# Patient Record
Sex: FEMALE | Race: WHITE | Employment: FULL TIME | URBAN - METROPOLITAN AREA
[De-identification: names, ages, dates, MRNs, and addresses within clinical notes are randomized per-mention and may not be internally consistent; named-entity substitution may affect disease eponyms.]

---

## 2018-01-24 ENCOUNTER — HOSPITAL ENCOUNTER (OUTPATIENT)
Dept: MAMMOGRAPHY | Age: 58
Discharge: HOME | End: 2018-01-24
Attending: INTERNAL MEDICINE | Admitting: INTERNAL MEDICINE

## 2018-01-24 DIAGNOSIS — Z12.31 VISIT FOR SCREENING MAMMOGRAM: ICD-10-CM

## 2019-03-29 ENCOUNTER — HOSPITAL ENCOUNTER (OUTPATIENT)
Dept: MAMMOGRAPHY | Age: 59
Discharge: HOME | End: 2019-04-03
Payer: COMMERCIAL

## 2019-03-29 DIAGNOSIS — Z12.31 VISIT FOR SCREENING MAMMOGRAM: ICD-10-CM

## 2019-03-29 PROCEDURE — 77067 SCR MAMMO BI INCL CAD: CPT

## 2019-12-24 ENCOUNTER — HOSPITAL ENCOUNTER (OUTPATIENT)
Age: 59
Setting detail: OBSERVATION
Discharge: HOME | End: 2019-12-25
Attending: EMERGENCY MEDICINE | Admitting: INTERNAL MEDICINE
Payer: COMMERCIAL

## 2019-12-24 ENCOUNTER — APPOINTMENT (OUTPATIENT)
Dept: GENERAL RADIOLOGY | Age: 59
End: 2019-12-24
Payer: COMMERCIAL

## 2019-12-24 DIAGNOSIS — R07.9 CHEST PAIN, UNSPECIFIED TYPE: Primary | ICD-10-CM

## 2019-12-24 LAB
A/G RATIO: 2 (ref 1.1–2.2)
ALBUMIN SERPL-MCNC: 4.6 G/DL (ref 3.4–5)
ALP BLD-CCNC: 50 U/L (ref 40–129)
ALT SERPL-CCNC: 14 U/L (ref 10–40)
ANION GAP SERPL CALCULATED.3IONS-SCNC: 15 MMOL/L (ref 3–16)
AST SERPL-CCNC: 19 U/L (ref 15–37)
BASOPHILS ABSOLUTE: 0 K/UL (ref 0–0.2)
BASOPHILS RELATIVE PERCENT: 0.6 %
BILIRUB SERPL-MCNC: 0.3 MG/DL (ref 0–1)
BUN BLDV-MCNC: 16 MG/DL (ref 7–20)
CALCIUM SERPL-MCNC: 10 MG/DL (ref 8.3–10.6)
CHLORIDE BLD-SCNC: 107 MMOL/L (ref 99–110)
CO2: 21 MMOL/L (ref 21–32)
CREAT SERPL-MCNC: 0.6 MG/DL (ref 0.6–1.2)
EOSINOPHILS ABSOLUTE: 0.2 K/UL (ref 0–0.6)
EOSINOPHILS RELATIVE PERCENT: 2.5 %
GFR AFRICAN AMERICAN: >60
GFR NON-AFRICAN AMERICAN: >60
GLOBULIN: 2.3 G/DL
GLUCOSE BLD-MCNC: 102 MG/DL (ref 70–99)
HCT VFR BLD CALC: 38.8 % (ref 36–48)
HEMOGLOBIN: 13 G/DL (ref 12–16)
LYMPHOCYTES ABSOLUTE: 1.7 K/UL (ref 1–5.1)
LYMPHOCYTES RELATIVE PERCENT: 26.5 %
MCH RBC QN AUTO: 29.9 PG (ref 26–34)
MCHC RBC AUTO-ENTMCNC: 33.5 G/DL (ref 31–36)
MCV RBC AUTO: 89.2 FL (ref 80–100)
MONOCYTES ABSOLUTE: 0.5 K/UL (ref 0–1.3)
MONOCYTES RELATIVE PERCENT: 6.8 %
NEUTROPHILS ABSOLUTE: 4.2 K/UL (ref 1.7–7.7)
NEUTROPHILS RELATIVE PERCENT: 63.6 %
PDW BLD-RTO: 13.7 % (ref 12.4–15.4)
PLATELET # BLD: 225 K/UL (ref 135–450)
PMV BLD AUTO: 9.2 FL (ref 5–10.5)
POTASSIUM REFLEX MAGNESIUM: 4.2 MMOL/L (ref 3.5–5.1)
RBC # BLD: 4.35 M/UL (ref 4–5.2)
SODIUM BLD-SCNC: 143 MMOL/L (ref 136–145)
TOTAL PROTEIN: 6.9 G/DL (ref 6.4–8.2)
TROPONIN: <0.01 NG/ML
WBC # BLD: 6.6 K/UL (ref 4–11)

## 2019-12-24 PROCEDURE — 71046 X-RAY EXAM CHEST 2 VIEWS: CPT

## 2019-12-24 PROCEDURE — 2580000003 HC RX 258: Performed by: INTERNAL MEDICINE

## 2019-12-24 PROCEDURE — 99285 EMERGENCY DEPT VISIT HI MDM: CPT

## 2019-12-24 PROCEDURE — 6370000000 HC RX 637 (ALT 250 FOR IP): Performed by: INTERNAL MEDICINE

## 2019-12-24 PROCEDURE — 85025 COMPLETE CBC W/AUTO DIFF WBC: CPT

## 2019-12-24 PROCEDURE — G0378 HOSPITAL OBSERVATION PER HR: HCPCS

## 2019-12-24 PROCEDURE — 36415 COLL VENOUS BLD VENIPUNCTURE: CPT

## 2019-12-24 PROCEDURE — 80053 COMPREHEN METABOLIC PANEL: CPT

## 2019-12-24 PROCEDURE — 93005 ELECTROCARDIOGRAM TRACING: CPT | Performed by: PHYSICIAN ASSISTANT

## 2019-12-24 PROCEDURE — 84484 ASSAY OF TROPONIN QUANT: CPT

## 2019-12-24 PROCEDURE — 6370000000 HC RX 637 (ALT 250 FOR IP): Performed by: PHYSICIAN ASSISTANT

## 2019-12-24 PROCEDURE — 82607 VITAMIN B-12: CPT

## 2019-12-24 PROCEDURE — 82746 ASSAY OF FOLIC ACID SERUM: CPT

## 2019-12-24 RX ORDER — ASPIRIN 81 MG/1
324 TABLET, CHEWABLE ORAL ONCE
Status: COMPLETED | OUTPATIENT
Start: 2019-12-24 | End: 2019-12-24

## 2019-12-24 RX ORDER — SODIUM CHLORIDE 0.9 % (FLUSH) 0.9 %
10 SYRINGE (ML) INJECTION EVERY 12 HOURS SCHEDULED
Status: DISCONTINUED | OUTPATIENT
Start: 2019-12-24 | End: 2019-12-25 | Stop reason: HOSPADM

## 2019-12-24 RX ORDER — SODIUM CHLORIDE 0.9 % (FLUSH) 0.9 %
10 SYRINGE (ML) INJECTION PRN
Status: DISCONTINUED | OUTPATIENT
Start: 2019-12-24 | End: 2019-12-25 | Stop reason: HOSPADM

## 2019-12-24 RX ORDER — ACETAMINOPHEN 325 MG/1
650 TABLET ORAL EVERY 4 HOURS PRN
Status: DISCONTINUED | OUTPATIENT
Start: 2019-12-24 | End: 2019-12-25 | Stop reason: HOSPADM

## 2019-12-24 RX ORDER — NITROGLYCERIN 0.4 MG/1
0.4 TABLET SUBLINGUAL EVERY 5 MIN PRN
Status: DISCONTINUED | OUTPATIENT
Start: 2019-12-24 | End: 2019-12-25 | Stop reason: HOSPADM

## 2019-12-24 RX ORDER — POTASSIUM CHLORIDE 20 MEQ/1
40 TABLET, EXTENDED RELEASE ORAL PRN
Status: DISCONTINUED | OUTPATIENT
Start: 2019-12-24 | End: 2019-12-25 | Stop reason: HOSPADM

## 2019-12-24 RX ORDER — ASPIRIN 81 MG/1
81 TABLET, CHEWABLE ORAL DAILY
Status: DISCONTINUED | OUTPATIENT
Start: 2019-12-25 | End: 2019-12-25 | Stop reason: HOSPADM

## 2019-12-24 RX ORDER — ONDANSETRON 2 MG/ML
4 INJECTION INTRAMUSCULAR; INTRAVENOUS EVERY 6 HOURS PRN
Status: DISCONTINUED | OUTPATIENT
Start: 2019-12-24 | End: 2019-12-25 | Stop reason: HOSPADM

## 2019-12-24 RX ORDER — MAGNESIUM SULFATE 1 G/100ML
1 INJECTION INTRAVENOUS PRN
Status: DISCONTINUED | OUTPATIENT
Start: 2019-12-24 | End: 2019-12-25 | Stop reason: HOSPADM

## 2019-12-24 RX ORDER — ATORVASTATIN CALCIUM 40 MG/1
40 TABLET, FILM COATED ORAL NIGHTLY
Status: DISCONTINUED | OUTPATIENT
Start: 2019-12-24 | End: 2019-12-25 | Stop reason: HOSPADM

## 2019-12-24 RX ORDER — NITROGLYCERIN 0.4 MG/1
0.4 TABLET SUBLINGUAL EVERY 5 MIN PRN
Status: DISCONTINUED | OUTPATIENT
Start: 2019-12-24 | End: 2019-12-24 | Stop reason: SDUPTHER

## 2019-12-24 RX ORDER — POTASSIUM CHLORIDE 7.45 MG/ML
10 INJECTION INTRAVENOUS PRN
Status: DISCONTINUED | OUTPATIENT
Start: 2019-12-24 | End: 2019-12-25 | Stop reason: HOSPADM

## 2019-12-24 RX ADMIN — ASPIRIN 81 MG 324 MG: 81 TABLET ORAL at 18:33

## 2019-12-24 RX ADMIN — METOPROLOL TARTRATE 25 MG: 25 TABLET ORAL at 20:45

## 2019-12-24 RX ADMIN — SODIUM CHLORIDE, PRESERVATIVE FREE 10 ML: 5 INJECTION INTRAVENOUS at 22:20

## 2019-12-24 RX ADMIN — ATORVASTATIN CALCIUM 40 MG: 40 TABLET, FILM COATED ORAL at 22:20

## 2019-12-24 ASSESSMENT — PAIN SCALES - GENERAL
PAINLEVEL_OUTOF10: 8
PAINLEVEL_OUTOF10: 0

## 2019-12-24 ASSESSMENT — PAIN DESCRIPTION - DESCRIPTORS: DESCRIPTORS: BURNING

## 2019-12-24 ASSESSMENT — PAIN DESCRIPTION - PAIN TYPE: TYPE: ACUTE PAIN

## 2019-12-24 ASSESSMENT — HEART SCORE: ECG: 1

## 2019-12-24 ASSESSMENT — ENCOUNTER SYMPTOMS
COLOR CHANGE: 0
VOMITING: 1
CHEST TIGHTNESS: 1
ABDOMINAL PAIN: 0
NAUSEA: 1
SHORTNESS OF BREATH: 0

## 2019-12-24 ASSESSMENT — PAIN - FUNCTIONAL ASSESSMENT: PAIN_FUNCTIONAL_ASSESSMENT: ACTIVITIES ARE NOT PREVENTED

## 2019-12-24 ASSESSMENT — PAIN DESCRIPTION - ONSET: ONSET: GRADUAL

## 2019-12-24 ASSESSMENT — PAIN DESCRIPTION - FREQUENCY: FREQUENCY: INTERMITTENT

## 2019-12-24 ASSESSMENT — PAIN DESCRIPTION - PROGRESSION: CLINICAL_PROGRESSION: NOT CHANGED

## 2019-12-24 ASSESSMENT — PAIN DESCRIPTION - LOCATION: LOCATION: CHEST

## 2019-12-25 VITALS
BODY MASS INDEX: 29.09 KG/M2 | HEART RATE: 60 BPM | DIASTOLIC BLOOD PRESSURE: 96 MMHG | RESPIRATION RATE: 17 BRPM | SYSTOLIC BLOOD PRESSURE: 161 MMHG | HEIGHT: 65 IN | OXYGEN SATURATION: 96 % | TEMPERATURE: 98.4 F | WEIGHT: 174.6 LBS

## 2019-12-25 LAB
EKG ATRIAL RATE: 62 BPM
EKG ATRIAL RATE: 72 BPM
EKG ATRIAL RATE: 89 BPM
EKG DIAGNOSIS: NORMAL
EKG P AXIS: 64 DEGREES
EKG P AXIS: 66 DEGREES
EKG P AXIS: 69 DEGREES
EKG P-R INTERVAL: 156 MS
EKG P-R INTERVAL: 158 MS
EKG P-R INTERVAL: 162 MS
EKG Q-T INTERVAL: 388 MS
EKG Q-T INTERVAL: 408 MS
EKG Q-T INTERVAL: 430 MS
EKG QRS DURATION: 92 MS
EKG QRS DURATION: 98 MS
EKG QRS DURATION: 98 MS
EKG QTC CALCULATION (BAZETT): 436 MS
EKG QTC CALCULATION (BAZETT): 446 MS
EKG QTC CALCULATION (BAZETT): 472 MS
EKG R AXIS: 53 DEGREES
EKG R AXIS: 68 DEGREES
EKG R AXIS: 80 DEGREES
EKG T AXIS: 54 DEGREES
EKG T AXIS: 54 DEGREES
EKG T AXIS: 66 DEGREES
EKG VENTRICULAR RATE: 62 BPM
EKG VENTRICULAR RATE: 72 BPM
EKG VENTRICULAR RATE: 89 BPM
FERRITIN: 56.8 NG/ML (ref 15–150)
FOLATE: >20 NG/ML (ref 4.78–24.2)
HCT VFR BLD CALC: 38.7 % (ref 36–48)
HEMOGLOBIN: 12.7 G/DL (ref 12–16)
IRON SATURATION: 18 % (ref 15–50)
IRON: 51 UG/DL (ref 37–145)
MCH RBC QN AUTO: 29.2 PG (ref 26–34)
MCHC RBC AUTO-ENTMCNC: 32.8 G/DL (ref 31–36)
MCV RBC AUTO: 89.1 FL (ref 80–100)
PDW BLD-RTO: 14.3 % (ref 12.4–15.4)
PLATELET # BLD: 234 K/UL (ref 135–450)
PMV BLD AUTO: 9.3 FL (ref 5–10.5)
RBC # BLD: 4.34 M/UL (ref 4–5.2)
TOTAL IRON BINDING CAPACITY: 285 UG/DL (ref 260–445)
TROPONIN: <0.01 NG/ML
TROPONIN: <0.01 NG/ML
VITAMIN B-12: 511 PG/ML (ref 211–911)
WBC # BLD: 5.4 K/UL (ref 4–11)

## 2019-12-25 PROCEDURE — 6370000000 HC RX 637 (ALT 250 FOR IP): Performed by: NURSE PRACTITIONER

## 2019-12-25 PROCEDURE — 93010 ELECTROCARDIOGRAM REPORT: CPT | Performed by: INTERNAL MEDICINE

## 2019-12-25 PROCEDURE — 94760 N-INVAS EAR/PLS OXIMETRY 1: CPT

## 2019-12-25 PROCEDURE — 93005 ELECTROCARDIOGRAM TRACING: CPT | Performed by: INTERNAL MEDICINE

## 2019-12-25 PROCEDURE — 85027 COMPLETE CBC AUTOMATED: CPT

## 2019-12-25 PROCEDURE — 83540 ASSAY OF IRON: CPT

## 2019-12-25 PROCEDURE — 6370000000 HC RX 637 (ALT 250 FOR IP): Performed by: INTERNAL MEDICINE

## 2019-12-25 PROCEDURE — 99205 OFFICE O/P NEW HI 60 MIN: CPT | Performed by: INTERNAL MEDICINE

## 2019-12-25 PROCEDURE — 6360000002 HC RX W HCPCS: Performed by: INTERNAL MEDICINE

## 2019-12-25 PROCEDURE — 82728 ASSAY OF FERRITIN: CPT

## 2019-12-25 PROCEDURE — 96372 THER/PROPH/DIAG INJ SC/IM: CPT

## 2019-12-25 PROCEDURE — G0378 HOSPITAL OBSERVATION PER HR: HCPCS

## 2019-12-25 PROCEDURE — 83550 IRON BINDING TEST: CPT

## 2019-12-25 PROCEDURE — 2580000003 HC RX 258: Performed by: INTERNAL MEDICINE

## 2019-12-25 PROCEDURE — 84484 ASSAY OF TROPONIN QUANT: CPT

## 2019-12-25 PROCEDURE — 36415 COLL VENOUS BLD VENIPUNCTURE: CPT

## 2019-12-25 RX ORDER — METFORMIN HYDROCHLORIDE 500 MG/1
500 TABLET, EXTENDED RELEASE ORAL
COMMUNITY
End: 2021-05-30

## 2019-12-25 RX ORDER — ASCORBIC ACID 1000 MG
TABLET ORAL
COMMUNITY

## 2019-12-25 RX ORDER — ASCORBIC ACID 500 MG
500 TABLET ORAL DAILY
COMMUNITY
End: 2021-05-30

## 2019-12-25 RX ORDER — ROSUVASTATIN CALCIUM 10 MG/1
10 TABLET, COATED ORAL DAILY
COMMUNITY

## 2019-12-25 RX ORDER — M-VIT,TX,IRON,MINS/CALC/FOLIC 27MG-0.4MG
1 TABLET ORAL DAILY
COMMUNITY

## 2019-12-25 RX ORDER — PANTOPRAZOLE SODIUM 40 MG/1
40 TABLET, DELAYED RELEASE ORAL ONCE
Status: COMPLETED | OUTPATIENT
Start: 2019-12-25 | End: 2019-12-25

## 2019-12-25 RX ORDER — PANTOPRAZOLE SODIUM 40 MG/1
40 TABLET, DELAYED RELEASE ORAL
Qty: 180 TABLET | Refills: 0 | Status: SHIPPED | OUTPATIENT
Start: 2019-12-25 | End: 2021-05-30

## 2019-12-25 RX ADMIN — PANTOPRAZOLE SODIUM 40 MG: 40 TABLET, DELAYED RELEASE ORAL at 11:03

## 2019-12-25 RX ADMIN — ASPIRIN 81 MG 81 MG: 81 TABLET ORAL at 10:13

## 2019-12-25 RX ADMIN — SODIUM CHLORIDE, PRESERVATIVE FREE 10 ML: 5 INJECTION INTRAVENOUS at 10:13

## 2019-12-25 RX ADMIN — ENOXAPARIN SODIUM 40 MG: 40 INJECTION SUBCUTANEOUS at 10:13

## 2019-12-25 ASSESSMENT — PAIN SCALES - GENERAL
PAINLEVEL_OUTOF10: 0
PAINLEVEL_OUTOF10: 0

## 2020-01-31 ENCOUNTER — HOSPITAL ENCOUNTER (OUTPATIENT)
Dept: NUCLEAR MEDICINE | Age: 60
Discharge: HOME | End: 2020-01-31
Payer: COMMERCIAL

## 2020-01-31 PROCEDURE — 78264 GASTRIC EMPTYING IMG STUDY: CPT

## 2020-01-31 PROCEDURE — A9541 TC99M SULFUR COLLOID: HCPCS | Performed by: INTERNAL MEDICINE

## 2020-01-31 PROCEDURE — 3430000000 HC RX DIAGNOSTIC RADIOPHARMACEUTICAL: Performed by: INTERNAL MEDICINE

## 2020-01-31 RX ADMIN — Medication 500 MICRO CURIE: at 09:34

## 2020-11-05 ENCOUNTER — HOSPITAL ENCOUNTER (OUTPATIENT)
Dept: MAMMOGRAPHY | Age: 60
Discharge: HOME | End: 2020-11-10
Payer: COMMERCIAL

## 2020-11-05 PROCEDURE — 77067 SCR MAMMO BI INCL CAD: CPT

## 2021-05-30 ENCOUNTER — APPOINTMENT (OUTPATIENT)
Dept: GENERAL RADIOLOGY | Age: 61
End: 2021-05-30
Payer: COMMERCIAL

## 2021-05-30 ENCOUNTER — HOSPITAL ENCOUNTER (EMERGENCY)
Age: 61
Discharge: HOME | End: 2021-05-31
Payer: COMMERCIAL

## 2021-05-30 DIAGNOSIS — S82.001A CLOSED DISPLACED FRACTURE OF RIGHT PATELLA, UNSPECIFIED FRACTURE MORPHOLOGY, INITIAL ENCOUNTER: ICD-10-CM
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
## 2021-05-30 PROCEDURE — 73562 X-RAY EXAM OF KNEE 3: CPT

## 2021-05-30 PROCEDURE — 99284 EMERGENCY DEPT VISIT MOD MDM: CPT

## 2021-05-30 RX ORDER — ASPIRIN 81 MG/1
81 TABLET, CHEWABLE ORAL DAILY
COMMUNITY

## 2021-05-30 RX ORDER — OMEPRAZOLE 10 MG/1
10 CAPSULE, DELAYED RELEASE ORAL DAILY
COMMUNITY

## 2021-05-31 VITALS
BODY MASS INDEX: 28.81 KG/M2 | SYSTOLIC BLOOD PRESSURE: 111 MMHG | OXYGEN SATURATION: 95 % | HEIGHT: 66 IN | WEIGHT: 179.23 LBS | RESPIRATION RATE: 18 BRPM | TEMPERATURE: 97.9 F | DIASTOLIC BLOOD PRESSURE: 75 MMHG | HEART RATE: 69 BPM

## 2021-05-31 RX ORDER — HYDROCODONE BITARTRATE AND ACETAMINOPHEN 5; 325 MG/1; MG/1
1 TABLET ORAL EVERY 6 HOURS PRN
Qty: 12 TABLET | Refills: 0 | Status: SHIPPED | OUTPATIENT
Start: 2021-05-31 | End: 2021-06-03

## 2021-05-31 ASSESSMENT — PAIN DESCRIPTION - DESCRIPTORS: DESCRIPTORS: ACHING

## 2021-05-31 ASSESSMENT — PAIN DESCRIPTION - LOCATION: LOCATION: KNEE

## 2021-05-31 ASSESSMENT — PAIN SCALES - GENERAL: PAINLEVEL_OUTOF10: 2

## 2021-05-31 ASSESSMENT — PAIN DESCRIPTION - PAIN TYPE: TYPE: ACUTE PAIN

## 2021-05-31 ASSESSMENT — PAIN DESCRIPTION - ORIENTATION: ORIENTATION: RIGHT

## 2021-05-31 ASSESSMENT — ENCOUNTER SYMPTOMS: COLOR CHANGE: 1

## 2021-05-31 NOTE — ED TRIAGE NOTES
Patient presents to ED complaining of right knee pain after a fall from standing height. Patient states she slipped on water and fell directly on the right knee. Denies striking head, denies LOC. Patient resting on bed, respirations even and easy at this time. No obvious distress.

## 2021-05-31 NOTE — ED PROVIDER NOTES
**ADVANCED PRACTICE PROVIDER, I HAVE EVALUATED THIS PATIENT**        [address]      Pt Name: Yajaira Avila  IKZ:[id number]  [place] 1960  Date of evaluation: 5/30/2021  Provider: VANESSA Becerril CNP      Chief Complaint:    Chief Complaint   Patient presents with   Lowery Ramona    Knee Pain         Nursing Notes, Past Medical Hx, Past Surgical Hx, Social Hx, Allergies, and Family Hx were all reviewed and agreed with or any disagreements were addressed in the HPI.    HPI: (Location, Duration, Timing, Severity, Quality, Assoc Sx, Context, Modifying factors)  This is a  56 y.o. female who presents to the emergency department today via EMS complaining of right knee pain. Onset was prior to arrival.  The context was that she fell onto her right knee after slipping on a wet floor at a restaurant. She did not hit her head or lose consciousness. No neck or back pain. Pain worse with any sort of movement or ambulation or weightbearing. Pain rated 2/10.     PastMedical/Surgical History:      Diagnosis Date    Hyperlipidemia          Procedure Laterality Date    APPENDECTOMY      KNEE CARTILAGE SURGERY Right 10/2020    TONSILLECTOMY         Medications:  Discharge Medication List as of 5/31/2021 12:58 AM      CONTINUE these medications which have NOT CHANGED    Details   aspirin 81 MG chewable tablet Take 81 mg by mouth dailyHistorical Med      VITAMIN D PO Take by mouthHistorical Med      omeprazole (PRILOSEC) 10 MG delayed release capsule Take 10 mg by mouth dailyHistorical Med      Multiple Vitamins-Minerals (THERAPEUTIC MULTIVITAMIN-MINERALS) tablet Take 1 tablet by mouth dailyHistorical Med      rosuvastatin (CRESTOR) 10 MG tablet Take 10 mg by mouth dailyHistorical Med      Coenzyme Q10 (CO Q 10) 10 MG CAPS Take by mouthHistorical Med               Review of Systems:  (2-9 systems needed)  Review of Systems   Constitutional: Negative for diaphoresis. Musculoskeletal: Positive for arthralgias, gait problem and joint swelling (right knee). Skin: Positive for color change (bruising right knee). Negative for wound. Allergic/Immunologic: Negative for immunocompromised state. Neurological: Negative for numbness. Psychiatric/Behavioral: Negative for confusion. All other systems reviewed and are negative. Physical Exam:  Physical Exam  Vitals and nursing note reviewed. Constitutional:       General: She is not in acute distress. Appearance: Normal appearance. She is well-developed. She is not toxic-appearing. HENT:      Head: Normocephalic and atraumatic. Eyes:      General: No scleral icterus. Conjunctiva/sclera: Conjunctivae normal.   Neck:      Vascular: No JVD. Cardiovascular:      Rate and Rhythm: Normal rate and regular rhythm. Heart sounds: Normal heart sounds. Pulmonary:      Effort: Pulmonary effort is normal. No respiratory distress. Breath sounds: Normal breath sounds. Abdominal:      Palpations: Abdomen is not rigid. Musculoskeletal:         General: Normal range of motion. Cervical back: Full passive range of motion without pain and neck supple. No spinous process tenderness or muscular tenderness. Right knee: Swelling present. No lacerations. Tenderness present. Skin:     General: Skin is warm and dry. Capillary Refill: Capillary refill takes less than 2 seconds. Findings: Bruising present. Neurological:      General: No focal deficit present. Mental Status: She is alert and oriented to person, place, and time. Cranial Nerves: Cranial nerves are intact. Sensory: Sensation is intact. Motor: Motor function is intact.    Psychiatric:         Mood and Affect: Mood normal.         MEDICAL DECISION MAKING    Vitals:    Vitals:    05/30/21 2303 05/31/21 0104   BP: (!) 134/93 111/75   Pulse: 64 69   Resp: 17 18   Temp: 97.9 °F (36.6 °C) TempSrc: Oral    SpO2: 98% 95%   Weight: 179 lb 3.7 oz (81.3 kg)    Height: 5' 5.5\" (1.664 m)        LABS:Labs Reviewed - No data to display     Remainder of labs reviewed and were negative at this time or not returned at the time of this note. RADIOLOGY:   Non-plain film images such as CT, Ultrasound and MRI are read by the radiologist. VANESSA North CNP have directly visualized the radiologic plain film image(s) with the below findings:      Interpretation per the Radiologist below, if available at the time of this note:    XR KNEE RIGHT (3 VIEWS)   Preliminary Result   1. Acute transverse fracture of the patella with 3.1 cm of displacement. 2. Knee soft tissue swelling with small knee joint effusion. XR KNEE RIGHT (3 VIEWS)    Result Date: 5/30/2021  EXAMINATION: THREE XRAY VIEWS OF THE RIGHT KNEE 5/30/2021 11:06 pm COMPARISON: None. HISTORY: ORDERING SYSTEM PROVIDED HISTORY: pain after fall TECHNOLOGIST PROVIDED HISTORY: Reason for exam:->pain after fall Reason for Exam: pain after fall Acuity: Acute Type of Exam: Initial Mechanism of Injury: pain after fall FINDINGS: 3 views of the knee demonstrate an acute transverse fracture of the patella with 3.1 cm of displacement. Normal bony mineralization. No suspicious osseous lesion. No significant degenerative changes. Mild knee soft tissue swelling is present. Small knee joint effusion. 1. Acute transverse fracture of the patella with 3.1 cm of displacement. 2. Knee soft tissue swelling with small knee joint effusion. MEDICAL DECISION MAKING / ED COURSE:      PROCEDURES:   Procedures    None    Patient was given:  Medications - No data to display    Differential diagnosis: includes but not limited to Meniscus tear, ACL tear, tibial plateau fracture, extensor mechanism rupture, dislocation, Arterial Injury/Ischemia, Infection, Neurologic Deficit/Injury. Patient presents with knee injury.   See HPI for full for up to 3 days. , Disp-12 tablet, R-0Print             DISCONTINUED MEDICATIONS:  Discharge Medication List as of 5/31/2021 12:58 AM      STOP taking these medications       vitamin C (ASCORBIC ACID) 500 MG tablet Comments:   Reason for Stopping:         metFORMIN (GLUCOPHAGE-XR) 500 MG extended release tablet Comments:   Reason for Stopping:         pantoprazole (PROTONIX) 40 MG tablet Comments:   Reason for Stopping:                      (Please note the MDM and HPI sections of this note were completed with a voice recognition program.  Efforts were made to edit the dictations but occasionally words are mis-transcribed.)    Electronically signed, VANESSA Anthony CNP,           VANESSA Anthony CNP  05/31/21 5618

## 2021-05-31 NOTE — ED NOTES
Patient assisted from ED via wheelchair. AVS provided and discussed with patient. All questions answered. Patient verbalizes understanding of discharge instructions. Respirations even and easy. No obvious distress at this time. I have given crutches to the patient, adjusted them and provided complete instructions on safe use. Knee brace applied to right knee prior to discharge. Patient verbalizes comfrot with use and placement. CMS verified after placement.      Evelyn Gonzalez RN  05/31/21 0176

## 2021-05-31 NOTE — ED NOTES
Bed: C-18  Expected date:   Expected time:   Means of arrival:   Comments:  Brooke fall knee pain     Emir Granger RN  05/30/21 1397

## 2021-11-15 ENCOUNTER — CLINICAL DOCUMENTATION (OUTPATIENT)
Dept: OTHER | Age: 61
End: 2021-11-15

## 2021-12-13 ENCOUNTER — HOSPITAL ENCOUNTER (OUTPATIENT)
Dept: MAMMOGRAPHY | Age: 61
Discharge: HOME | End: 2021-12-18
Payer: COMMERCIAL

## 2021-12-13 VITALS — BODY MASS INDEX: 26.52 KG/M2 | WEIGHT: 165 LBS | HEIGHT: 66 IN

## 2021-12-13 DIAGNOSIS — Z12.31 VISIT FOR SCREENING MAMMOGRAM: ICD-10-CM

## 2021-12-13 PROCEDURE — 77063 BREAST TOMOSYNTHESIS BI: CPT

## 2021-12-13 PROCEDURE — 77067 SCR MAMMO BI INCL CAD: CPT

## 2023-11-13 ENCOUNTER — HOSPITAL ENCOUNTER (OUTPATIENT)
Dept: MAMMOGRAPHY | Age: 63
Discharge: HOME | End: 2023-11-18
Payer: COMMERCIAL

## 2023-11-13 VITALS — BODY MASS INDEX: 27.49 KG/M2 | WEIGHT: 165 LBS | HEIGHT: 65 IN

## 2023-11-13 DIAGNOSIS — Z12.31 VISIT FOR SCREENING MAMMOGRAM: ICD-10-CM

## 2023-11-13 PROCEDURE — 77063 BREAST TOMOSYNTHESIS BI: CPT

## 2024-12-23 ENCOUNTER — HOSPITAL ENCOUNTER (OUTPATIENT)
Dept: MAMMOGRAPHY | Age: 64
Discharge: HOME | End: 2024-12-28
Payer: COMMERCIAL

## 2024-12-23 VITALS — BODY MASS INDEX: 26.52 KG/M2 | HEIGHT: 66 IN | WEIGHT: 165 LBS

## 2024-12-23 DIAGNOSIS — Z12.31 VISIT FOR SCREENING MAMMOGRAM: ICD-10-CM

## 2024-12-23 PROCEDURE — 77063 BREAST TOMOSYNTHESIS BI: CPT
